# Patient Record
Sex: FEMALE | Race: WHITE | NOT HISPANIC OR LATINO | URBAN - METROPOLITAN AREA
[De-identification: names, ages, dates, MRNs, and addresses within clinical notes are randomized per-mention and may not be internally consistent; named-entity substitution may affect disease eponyms.]

---

## 2018-07-01 ENCOUNTER — OUTPATIENT (OUTPATIENT)
Dept: OUTPATIENT SERVICES | Facility: HOSPITAL | Age: 38
LOS: 1 days | End: 2018-07-01
Payer: MEDICAID

## 2018-07-17 DIAGNOSIS — Z71.89 OTHER SPECIFIED COUNSELING: ICD-10-CM

## 2018-09-01 ENCOUNTER — OUTPATIENT (OUTPATIENT)
Dept: OUTPATIENT SERVICES | Facility: HOSPITAL | Age: 38
LOS: 1 days | End: 2018-09-01
Payer: MEDICAID

## 2019-08-15 DIAGNOSIS — Z71.89 OTHER SPECIFIED COUNSELING: ICD-10-CM

## 2019-09-01 PROCEDURE — H0002: CPT

## 2021-02-01 ENCOUNTER — OUTPATIENT (OUTPATIENT)
Dept: OUTPATIENT SERVICES | Facility: HOSPITAL | Age: 41
LOS: 1 days | End: 2021-02-01
Payer: MEDICAID

## 2021-02-01 PROCEDURE — H0002: CPT

## 2021-03-13 DIAGNOSIS — Z71.89 OTHER SPECIFIED COUNSELING: ICD-10-CM

## 2021-04-01 PROCEDURE — G9005: CPT

## 2022-10-28 ENCOUNTER — EMERGENCY (EMERGENCY)
Facility: HOSPITAL | Age: 42
LOS: 1 days | Discharge: OTHER INSTITUTION | End: 2022-10-28
Attending: STUDENT IN AN ORGANIZED HEALTH CARE EDUCATION/TRAINING PROGRAM
Payer: MEDICAID

## 2022-10-28 VITALS
OXYGEN SATURATION: 94 % | TEMPERATURE: 98 F | HEART RATE: 113 BPM | SYSTOLIC BLOOD PRESSURE: 124 MMHG | WEIGHT: 149.91 LBS | RESPIRATION RATE: 20 BRPM | DIASTOLIC BLOOD PRESSURE: 89 MMHG

## 2022-10-28 DIAGNOSIS — F20.9 SCHIZOPHRENIA, UNSPECIFIED: ICD-10-CM

## 2022-10-28 LAB
ALBUMIN SERPL ELPH-MCNC: 3.9 G/DL — SIGNIFICANT CHANGE UP (ref 3.3–5.2)
ALP SERPL-CCNC: 100 U/L — SIGNIFICANT CHANGE UP (ref 40–120)
ALT FLD-CCNC: 30 U/L — SIGNIFICANT CHANGE UP
AMPHET UR-MCNC: NEGATIVE — SIGNIFICANT CHANGE UP
ANION GAP SERPL CALC-SCNC: 11 MMOL/L — SIGNIFICANT CHANGE UP (ref 5–17)
APPEARANCE UR: CLEAR — SIGNIFICANT CHANGE UP
AST SERPL-CCNC: 26 U/L — SIGNIFICANT CHANGE UP
BARBITURATES UR SCN-MCNC: NEGATIVE — SIGNIFICANT CHANGE UP
BASOPHILS # BLD AUTO: 0.04 K/UL — SIGNIFICANT CHANGE UP (ref 0–0.2)
BASOPHILS NFR BLD AUTO: 0.5 % — SIGNIFICANT CHANGE UP (ref 0–2)
BENZODIAZ UR-MCNC: NEGATIVE — SIGNIFICANT CHANGE UP
BILIRUB SERPL-MCNC: <0.2 MG/DL — LOW (ref 0.4–2)
BILIRUB UR-MCNC: NEGATIVE — SIGNIFICANT CHANGE UP
BUN SERPL-MCNC: 16.1 MG/DL — SIGNIFICANT CHANGE UP (ref 8–20)
CALCIUM SERPL-MCNC: 9.3 MG/DL — SIGNIFICANT CHANGE UP (ref 8.4–10.5)
CHLORIDE SERPL-SCNC: 100 MMOL/L — SIGNIFICANT CHANGE UP (ref 96–108)
CO2 SERPL-SCNC: 24 MMOL/L — SIGNIFICANT CHANGE UP (ref 22–29)
COCAINE METAB.OTHER UR-MCNC: NEGATIVE — SIGNIFICANT CHANGE UP
COLOR SPEC: YELLOW — SIGNIFICANT CHANGE UP
CREAT SERPL-MCNC: 0.6 MG/DL — SIGNIFICANT CHANGE UP (ref 0.5–1.3)
DIFF PNL FLD: NEGATIVE — SIGNIFICANT CHANGE UP
EGFR: 116 ML/MIN/1.73M2 — SIGNIFICANT CHANGE UP
EOSINOPHIL # BLD AUTO: 0.1 K/UL — SIGNIFICANT CHANGE UP (ref 0–0.5)
EOSINOPHIL NFR BLD AUTO: 1.3 % — SIGNIFICANT CHANGE UP (ref 0–6)
EPI CELLS # UR: SIGNIFICANT CHANGE UP
ETHANOL SERPL-MCNC: <10 MG/DL — SIGNIFICANT CHANGE UP (ref 0–9)
GLUCOSE SERPL-MCNC: 103 MG/DL — HIGH (ref 70–99)
GLUCOSE UR QL: NEGATIVE MG/DL — SIGNIFICANT CHANGE UP
HCG SERPL-ACNC: <4 MIU/ML — SIGNIFICANT CHANGE UP
HCT VFR BLD CALC: 41 % — SIGNIFICANT CHANGE UP (ref 34.5–45)
HGB BLD-MCNC: 14.2 G/DL — SIGNIFICANT CHANGE UP (ref 11.5–15.5)
HIV 1 & 2 AB SERPL IA.RAPID: SIGNIFICANT CHANGE UP
IMM GRANULOCYTES NFR BLD AUTO: 0.4 % — SIGNIFICANT CHANGE UP (ref 0–0.9)
KETONES UR-MCNC: NEGATIVE — SIGNIFICANT CHANGE UP
LEUKOCYTE ESTERASE UR-ACNC: ABNORMAL
LYMPHOCYTES # BLD AUTO: 1.7 K/UL — SIGNIFICANT CHANGE UP (ref 1–3.3)
LYMPHOCYTES # BLD AUTO: 22.5 % — SIGNIFICANT CHANGE UP (ref 13–44)
MCHC RBC-ENTMCNC: 32.2 PG — SIGNIFICANT CHANGE UP (ref 27–34)
MCHC RBC-ENTMCNC: 34.6 GM/DL — SIGNIFICANT CHANGE UP (ref 32–36)
MCV RBC AUTO: 93 FL — SIGNIFICANT CHANGE UP (ref 80–100)
METHADONE UR-MCNC: NEGATIVE — SIGNIFICANT CHANGE UP
MONOCYTES # BLD AUTO: 0.5 K/UL — SIGNIFICANT CHANGE UP (ref 0–0.9)
MONOCYTES NFR BLD AUTO: 6.6 % — SIGNIFICANT CHANGE UP (ref 2–14)
NEUTROPHILS # BLD AUTO: 5.17 K/UL — SIGNIFICANT CHANGE UP (ref 1.8–7.4)
NEUTROPHILS NFR BLD AUTO: 68.7 % — SIGNIFICANT CHANGE UP (ref 43–77)
NITRITE UR-MCNC: NEGATIVE — SIGNIFICANT CHANGE UP
OPIATES UR-MCNC: NEGATIVE — SIGNIFICANT CHANGE UP
PCP SPEC-MCNC: SIGNIFICANT CHANGE UP
PCP UR-MCNC: NEGATIVE — SIGNIFICANT CHANGE UP
PH UR: 7 — SIGNIFICANT CHANGE UP (ref 5–8)
PLATELET # BLD AUTO: 241 K/UL — SIGNIFICANT CHANGE UP (ref 150–400)
POTASSIUM SERPL-MCNC: 4.8 MMOL/L — SIGNIFICANT CHANGE UP (ref 3.5–5.3)
POTASSIUM SERPL-SCNC: 4.8 MMOL/L — SIGNIFICANT CHANGE UP (ref 3.5–5.3)
PROT SERPL-MCNC: 6.3 G/DL — LOW (ref 6.6–8.7)
PROT UR-MCNC: NEGATIVE — SIGNIFICANT CHANGE UP
RBC # BLD: 4.41 M/UL — SIGNIFICANT CHANGE UP (ref 3.8–5.2)
RBC # FLD: 13 % — SIGNIFICANT CHANGE UP (ref 10.3–14.5)
RBC CASTS # UR COMP ASSIST: SIGNIFICANT CHANGE UP /HPF (ref 0–4)
SODIUM SERPL-SCNC: 135 MMOL/L — SIGNIFICANT CHANGE UP (ref 135–145)
SP GR SPEC: 1.01 — SIGNIFICANT CHANGE UP (ref 1.01–1.02)
THC UR QL: NEGATIVE — SIGNIFICANT CHANGE UP
UROBILINOGEN FLD QL: NEGATIVE MG/DL — SIGNIFICANT CHANGE UP
WBC # BLD: 7.54 K/UL — SIGNIFICANT CHANGE UP (ref 3.8–10.5)
WBC # FLD AUTO: 7.54 K/UL — SIGNIFICANT CHANGE UP (ref 3.8–10.5)
WBC UR QL: SIGNIFICANT CHANGE UP /HPF (ref 0–5)

## 2022-10-28 PROCEDURE — 99220: CPT

## 2022-10-28 PROCEDURE — 90792 PSYCH DIAG EVAL W/MED SRVCS: CPT

## 2022-10-28 PROCEDURE — 93010 ELECTROCARDIOGRAM REPORT: CPT

## 2022-10-28 RX ORDER — HYDROXYZINE HCL 10 MG
25 TABLET ORAL
Refills: 0 | Status: DISCONTINUED | OUTPATIENT
Start: 2022-10-28 | End: 2022-11-04

## 2022-10-28 RX ORDER — OLANZAPINE 15 MG/1
5 TABLET, FILM COATED ORAL AT BEDTIME
Refills: 0 | Status: DISCONTINUED | OUTPATIENT
Start: 2022-10-28 | End: 2022-11-04

## 2022-10-28 RX ORDER — SERTRALINE 25 MG/1
50 TABLET, FILM COATED ORAL DAILY
Refills: 0 | Status: DISCONTINUED | OUTPATIENT
Start: 2022-10-29 | End: 2022-11-04

## 2022-10-28 RX ORDER — TRAZODONE HCL 50 MG
100 TABLET ORAL AT BEDTIME
Refills: 0 | Status: DISCONTINUED | OUTPATIENT
Start: 2022-10-28 | End: 2022-11-04

## 2022-10-28 NOTE — ED PROVIDER NOTE - OBJECTIVE STATEMENT
As per EMS, patient's parents called 911 because patient has not been compliant with meds for the past few days and not eating; currently patient denies any complaints however does not provide any insight about her present condition

## 2022-10-28 NOTE — ED ADULT TRIAGE NOTE - CHIEF COMPLAINT QUOTE
C/o psychiatric evaluation. EMS states patient lives at home and has not been taking medication. Pt not answering questions. Hx of schizophrenia.

## 2022-10-28 NOTE — ED BEHAVIORAL HEALTH ASSESSMENT NOTE - HPI (INCLUDE ILLNESS QUALITY, SEVERITY, DURATION, TIMING, CONTEXT, MODIFYING FACTORS, ASSOCIATED SIGNS AND SYMPTOMS)
Patient is a 42 year old female who is disabled, living with parent with a past psychiatric history of Schizophrenia and remote history of alcohol abuse who is currently following with [facility] for outpatient treatment who was BIBA which was activated by mother for evaluation of worsening bizarre behavior in the setting of poor med compliance     Patient seen and evaluated and found to be overall calm and cooperative but dishevelled, suspicious at times and internally preoccupied. Patient states her parents called 911 but she is unsure why. Patient giving minimal answers and saying no to most questions. She does admit to seeing a psychiatrist but could not relate what her diagnosis is and states "sometimes" she takes medication. Patient denies any depression or anxiety and denies any difficulty with sleep and appetite, denies any s/h ideation, symptoms of jeremiah or AVH.     Collateral taken from mother who explains that patient has long history of schizophrenia and explains how over the past several weeks to months she has been worsening. Mother states patient has been talking and laughing to herself, growing increasingly paranoid while found locking herself in her room, locking family out of the house, getting verbally aggressive. She also states patients hygiene has been poor, has not been showering and needs to be convinced to eat.     Collateral info taken from patient outpatient therapist at [facility] (mat- [phone]) who states patient has been decompensating over the past several weeks with a plan to switch her from Invega sustenna to Abilify maintenna. Also has been starting to refuse meds, was due for her Invega CHANG today but refused.

## 2022-10-28 NOTE — ED BEHAVIORAL HEALTH ASSESSMENT NOTE - SUMMARY
Patient is a 42 year old female who is disabled, living with parent with a past psychiatric history of Schizophrenia and remote history of alcohol abuse who is currently following with [facility] for outpatient treatment who was BIBA which was activated by mother for evaluation of worsening bizarre behavior in the setting of poor med compliance     Patient seen and evaluated and currently presents disorganized, paranoid and worsening AH in the setting of poor medication compliance. Patient with poor ADLS, unable to care for self and requires inpatient psychiatric admission (DOCS 9.37)

## 2022-10-28 NOTE — ED BEHAVIORAL HEALTH ASSESSMENT NOTE - DESCRIPTION
single, no children, lives with parents denies Vital Signs Last 24 Hrs  T(C): 36.4 (28 Oct 2022 15:47), Max: 36.7 (28 Oct 2022 11:57)  T(F): 97.5 (28 Oct 2022 15:47), Max: 98 (28 Oct 2022 11:57)  HR: 83 (28 Oct 2022 15:47) (83 - 113)  BP: 104/62 (28 Oct 2022 15:47) (104/62 - 124/89)  BP(mean): --  RR: 18 (28 Oct 2022 15:47) (18 - 20)  SpO2: 95% (28 Oct 2022 15:47) (94% - 95%)    Parameters below as of 28 Oct 2022 15:47  Patient On (Oxygen Delivery Method): room air

## 2022-10-28 NOTE — ED ADULT NURSE NOTE - OBJECTIVE STATEMENT
Patient is oriented X4 at this time. Patient is currently lethargic. Patient comes to the ER for a psychiatric evaluation. As per EMS states patient lives at home and has not been taking medication. Pt not answering questions. Patient currently denies pain, SI, HI, and not taking her meds. Patient also states she cant tell us what meds she is on. Patient has a PPMH of schizophrenia.

## 2022-10-28 NOTE — ED BEHAVIORAL HEALTH ASSESSMENT NOTE - CURRENT MEDICATION
Invega sustenna CHANG with last given oct 7th   Vistaril 25mg PO BID   Zyprexa 5mg po QHS   Zoloft 200mg daily  Trazodone 50-100mg PO QHS

## 2022-10-28 NOTE — ED PROVIDER NOTE - CLINICAL SUMMARY MEDICAL DECISION MAKING FREE TEXT BOX
labs and EKG results reviewed with patient; psychiatry assessment noted labs and EKG results reviewed with patient; psychiatry assessment noted; admit to obs for placement

## 2022-10-28 NOTE — CHART NOTE - NSCHARTNOTEFT_GEN_A_CORE
SWNote: pt seen by behavioral MD(Curtis) pt needs transfer 9.37 status. No bed available this evening [facility] (Rhianna) and/or [facility]( Pravin DANIELS) . SW to follow in the am .

## 2022-10-28 NOTE — ED CDU PROVIDER INITIAL DAY NOTE - NSCAREINITIATED _GEN_ER
[facility] [place]  Cardiology  [address], [address]  [place] [address]  Phone: [phone]  Fax: [phone]     Bakari Vazquez(Attending)

## 2022-10-29 LAB
RAPID RVP RESULT: DETECTED
RV+EV RNA SPEC QL NAA+PROBE: DETECTED
SARS-COV-2 RNA SPEC QL NAA+PROBE: SIGNIFICANT CHANGE UP

## 2022-10-29 PROCEDURE — 99226: CPT

## 2022-10-29 RX ORDER — HYDROXYZINE HCL 10 MG
0 TABLET ORAL
Qty: 0 | Refills: 0 | DISCHARGE

## 2022-10-29 RX ORDER — PALIPERIDONE 1.5 MG/1
0 TABLET, EXTENDED RELEASE ORAL
Qty: 0 | Refills: 0 | DISCHARGE

## 2022-10-29 RX ORDER — OLANZAPINE 15 MG/1
0 TABLET, FILM COATED ORAL
Qty: 0 | Refills: 0 | DISCHARGE

## 2022-10-29 RX ORDER — TRAZODONE HCL 50 MG
0 TABLET ORAL
Qty: 0 | Refills: 0 | DISCHARGE

## 2022-10-29 RX ORDER — SERTRALINE 25 MG/1
200 TABLET, FILM COATED ORAL
Qty: 0 | Refills: 0 | DISCHARGE

## 2022-10-29 RX ADMIN — OLANZAPINE 5 MILLIGRAM(S): 15 TABLET, FILM COATED ORAL at 21:25

## 2022-10-29 RX ADMIN — SERTRALINE 50 MILLIGRAM(S): 25 TABLET, FILM COATED ORAL at 09:35

## 2022-10-29 RX ADMIN — Medication 25 MILLIGRAM(S): at 17:40

## 2022-10-29 RX ADMIN — Medication 100 MILLIGRAM(S): at 21:26

## 2022-10-29 RX ADMIN — Medication 25 MILLIGRAM(S): at 06:40

## 2022-10-29 NOTE — ED ADULT NURSE REASSESSMENT NOTE - DESCRIPTION
received pt from ed in [facility] waned by security for contraband.  pt is poor  historian answering I don't know. only stating lives with mom and dad.  doesn't know why she is here.  unable to states [facility] phh.  denies etoh and drug use..  denies avh. escorted to room made comfortable.  awaiting transfer

## 2022-10-29 NOTE — CHART NOTE - NSCHARTNOTEFT_GEN_A_CORE
Note: Plan is to transfer pt for inpt psychiatric care. Labs completed, covid neg 10/29. No available beds for transfer today, called  [phone], [facility] ( spoke with RN  on call, [facility] [phone], [facility] [phone], [facility] [phone]  and [place] [phone]. Unable to reach any staff via phone at [facility] [phone].  team aware

## 2022-10-30 PROCEDURE — 93005 ELECTROCARDIOGRAM TRACING: CPT

## 2022-10-30 PROCEDURE — 81001 URINALYSIS AUTO W/SCOPE: CPT

## 2022-10-30 PROCEDURE — 99284 EMERGENCY DEPT VISIT MOD MDM: CPT

## 2022-10-30 PROCEDURE — 80307 DRUG TEST PRSMV CHEM ANLYZR: CPT

## 2022-10-30 PROCEDURE — 36415 COLL VENOUS BLD VENIPUNCTURE: CPT

## 2022-10-30 PROCEDURE — G0378: CPT

## 2022-10-30 PROCEDURE — 86703 HIV-1/HIV-2 1 RESULT ANTBDY: CPT

## 2022-10-30 PROCEDURE — 99217: CPT

## 2022-10-30 PROCEDURE — 84702 CHORIONIC GONADOTROPIN TEST: CPT

## 2022-10-30 PROCEDURE — 0225U NFCT DS DNA&RNA 21 SARSCOV2: CPT

## 2022-10-30 PROCEDURE — 80053 COMPREHEN METABOLIC PANEL: CPT

## 2022-10-30 PROCEDURE — 85025 COMPLETE CBC W/AUTO DIFF WBC: CPT

## 2022-10-30 PROCEDURE — 99282 EMERGENCY DEPT VISIT SF MDM: CPT

## 2022-10-30 RX ADMIN — Medication 25 MILLIGRAM(S): at 06:02

## 2022-10-30 RX ADMIN — OLANZAPINE 5 MILLIGRAM(S): 15 TABLET, FILM COATED ORAL at 22:17

## 2022-10-30 RX ADMIN — Medication 100 MILLIGRAM(S): at 22:17

## 2022-10-30 RX ADMIN — SERTRALINE 50 MILLIGRAM(S): 25 TABLET, FILM COATED ORAL at 09:33

## 2022-10-30 RX ADMIN — Medication 25 MILLIGRAM(S): at 18:04

## 2022-10-30 NOTE — ED ADULT NURSE REASSESSMENT NOTE - COMFORT CARE
darkened lights
meal provided/plan of care explained
meal provided/plan of care explained
darkened lights/plan of care explained
plan of care explained/po fluids offered
meal provided/plan of care explained
plan of care explained/warm blanket provided

## 2022-10-30 NOTE — ED CDU PROVIDER DISPOSITION NOTE - CLINICAL COURSE
42 year old female who is disabled, living with parent with a past psychiatric history of Schizophrenia and remote history of alcohol abuse who is currently following with [facility] for outpatient treatment who was BIBA which was activated by mother for evaluation of worsening bizarre behavior in the setting of poor med compliance     Patient seen and evaluated and currently presents disorganized, paranoid and worsening AH in the setting of poor medication compliance. Patient with poor ADLS, unable to care for self and requires inpatient psychiatric admission

## 2022-10-30 NOTE — ED BEHAVIORAL HEALTH NOTE - BEHAVIORAL HEALTH NOTE
· HPI : Patient is a 42 year old female who is disabled, living with parent with a past psychiatric history of Schizophrenia and remote history of alcohol abuse who is currently following with [facility] for outpatient treatment who was BIBA which was activated by mother for evaluation of worsening bizarre behavior in the setting of poor med compliance     Patient seen and evaluated and found to be overall calm and cooperative but dishevelled, suspicious at times and internally preoccupied. Patient states her parents called 911 but she is unsure why. Patient giving minimal answers and saying no to most questions. She does admit to seeing a psychiatrist but could not relate what her diagnosis is and states "sometimes" she takes medication. Patient denies any depression or anxiety and denies any difficulty with sleep and appetite, denies any s/h ideation, symptoms of jeremiah or AVH.     Collateral taken from mother who explains that patient has long history of schizophrenia and explains how over the past several weeks to months she has been worsening. Mother states patient has been talking and laughing to herself, growing increasingly paranoid while found locking herself in her room, locking family out of the house, getting verbally aggressive. She also states patients hygiene has been poor, has not been showering and needs to be convinced to eat.     Collateral info taken from patient outpatient therapist at [facility] (mat- [phone]) who states patient has been decompensating over the past several weeks with a plan to switch her from Invega Sustenna to Abilify Maintena. Also has been starting to refuse meds, was due for her Invega CHANG today but refused.    10/30/2022: Patient was seen today AM, chart reviewed and discussed in team. Mood OK, has weird body movements, has hx of Schizophrenia, meds compliant and does not know or remember her meds. She denied being depressed, denied A/V/H or Paranoid beliefs. Need to adjust meds for stability as per chart she was in the midst of adjusting meds from Invega to Abilify Maintena     MSE: ·    · General Appearance	No deformities present   .  Body Habitus	Average build  · Hygiene	Poor  · Grooming	Poor  · Behavior	Cooperative  · Eye Contact	Poor  · Relatedness	Poor  · Impulse Control	Impaired  · Muscle Tone / Strength	Normal muscle tone/strength  · Abnormal Movements	No abnormal movements  · Gait / Station	Other  · Other	unable to assess  · Speech	Normal volume, rate, productivity, spontaneity and articulation  · Reported mood	Normal  · Affect Quality	Anxious  · Affect Range	Blunted  · Affect Congruence	Congruent  · Thought Process	Disorganized/ Thought blocking  · Thought Associations	Loose  · Thought Content	Unremarkable  · Perceptions	Auditory hallucinations  · Command	No  · Orientation	Oriented to time, place, person, situation  · Attention / Concentration	Impaired  · Estimated Intelligence	Average  · Recent Memory	Normal  · Remote Memory	Normal  · Fund of Knowledge	Normal  · Language	No abnormalities noted  · Judgment (regarding everyday events)	Poor  · Insight (regarding psychiatric illness)	Poor    Labs: CBC-WNL           Chem--WNL    Diagnosis: Schizophrenia    Assessment: Patient was seen today AM, chart reviewed and discussed in team. Mood OK, has weird body movements, has hx of Schizophrenia, meds compliant and does not know or remember her meds. She denied being depressed, denied A/V/H or Paranoid beliefs. Need to adjust meds for stability as per chart she was in the midst of adjusting meds from Invega to Abilify Maintena    Plan: Need in-patient admission for stability and meds adjsutment          Mostly SO

## 2022-10-30 NOTE — ED ADULT NURSE REASSESSMENT NOTE - GENERAL PATIENT STATE
comfortable appearance/cooperative/resting/sleeping
comfortable appearance/cooperative
comfortable appearance/cooperative
comfortable appearance/cooperative/resting/sleeping
comfortable appearance/cooperative
comfortable appearance/cooperative
comfortable appearance/resting/sleeping
cooperative
comfortable appearance/cooperative
cooperative
cooperative

## 2022-10-30 NOTE — ED ADULT NURSE REASSESSMENT NOTE - STATUS
awaiting transfer, no change

## 2022-10-30 NOTE — ED CDU PROVIDER SUBSEQUENT DAY NOTE - NSICDXPASTMEDICALHX_GEN_ALL_CORE_FT
PAST MEDICAL HISTORY:  History of schizophrenia     
PAST MEDICAL HISTORY:  History of schizophrenia

## 2022-10-30 NOTE — CHART NOTE - NSCHARTNOTEFT_GEN_A_CORE
LEX Note: LEX following for inpt psych, pending bed availability. LEX placed call to [facility] (Kevin), [facility] (Monica) [facility] (Michel), [facility] (Kiera), [facility] (Cristina), [facility], no beds, placed call to [facility], no call back at this time. LEX placed call to [facility], a bed is available. LEX faxed chart for review, awaiting call back at this time with determination if they can accept, SW following LEX Note: LEX following for inpt psych, pending bed availability. LEX placed call to [facility] (Kevin), [facility] (Monica) [facility] (Michel), [facility] (Kiera), [facility] (Cristina), [facility], no beds, placed call to [facility], no call back at this time. LEX placed call to [facility], spoke to Yasemin, a bed is available. LEX faxed chart for review, awaiting call back at this time with determination if they can accept, of note pt's need to arrive on unit by 3pm, SW following

## 2022-10-31 VITALS
DIASTOLIC BLOOD PRESSURE: 86 MMHG | SYSTOLIC BLOOD PRESSURE: 138 MMHG | RESPIRATION RATE: 18 BRPM | TEMPERATURE: 99 F | OXYGEN SATURATION: 98 % | HEART RATE: 88 BPM

## 2022-11-01 NOTE — CHART NOTE - NSCHARTNOTEFT_GEN_A_CORE
LEX Note: completed precert for transfer to [facility]. Listed on chart is [facility] [phone]. Called and they stated pt has [employer] medicaid [phone]. Called  and spoke with Beulah. Ernesto approved for 4 days 10/30-11/2.Auth# [id number]. Info forwarded to [facility] UR dept.

## 2023-02-06 NOTE — ED PROVIDER NOTE - NSICDXFAMILYHX_GEN_ALL_CORE_FT
The patient has been re-examined and I agree with the above assessment or I updated with my findings.
FAMILY HISTORY:  No pertinent family history in first degree relatives

## 2023-10-02 NOTE — ED ADULT NURSE REASSESSMENT NOTE - NS ED NURSE REASSESS COMMENT FT1
Assumed care of patient at this time.  Pt alert and disorganized.  Pt requesting medication for sleep  Explained to patient that she will be transferred tonight and that it is too early for sleep medication.  Pt then requesting hot cocoa. and given will monitor and chart changes and maintain safe environment.
[facility] and David, supervisor states tat it should be another 40 mins
Patient ate 100% of lunch.  No attempts to harm self or others.  Patient educated about pending transfer to inpatient unit at [facility] and agrees.  Safety of patient maintained.
patient resting and sleeping at interval.  Awaiting on ambulance for transfer.  Will monitor and chart changes and maintain a safe environment
sleeping interval through shift.
spoke with Marina from [facility] to inform them that patient is still waiting on ambulance transportation but is still going to be sent, and will notify them when patient leaves
Patient oriented to staff and educated about plan of care.  Patient superificial on interaction.  Patient denies suicidal or homicidal ideations.  Patient appears internally preoccupied but denies auditory or visual hallucinations.  No overt delusions expressed during interaction.  Patient ate 75% of her breakfast.  Patient cooperative with COVID testing.  No attempts to harm self or others and safety maintained.
Assumed care of patient at 0710.  Patient resting in bed awake.  Patient oriented to staff and educated about plan of care.  Patient offers no complaints.  No attempts to harm self or others and safety maintained.
behavior remains bizarre.  denies avh seems internally preoccupied at times. no harm to  self. remains calm and cooperative
Assumed care of patient at 0710.  Patient resting in bed appears to be sleeping with no distress noted and regular nonlabored breathing.  Safety of patient maintained.
Patient ate 100% of her dinner.  Patient aware of suicidal or homicidal ideations.  Patient resting in bed awaiting transfer.  Safety of patient maintained.
Patient ate 100% of her lunch and provided decaf coffee as requested.  Patient continues to deny any psychotic symptoms.  No attempts to harm self or others and safety maintained.
Patient ate 75% of her breakfast and complaint with medications.  Patient remains superficial during interactions.  No attempts to harm self or others.  Compliant with medications and safety maintained.
Patient ate 75% of her dinner.  Patient is guarded with her thought content.  Remains superficial on interaction.  No attempts to harm self or others.  Patient provided supplies and showered independently.  Safety of patient maintained.
done
